# Patient Record
Sex: FEMALE | Race: WHITE | NOT HISPANIC OR LATINO | Employment: OTHER | URBAN - METROPOLITAN AREA
[De-identification: names, ages, dates, MRNs, and addresses within clinical notes are randomized per-mention and may not be internally consistent; named-entity substitution may affect disease eponyms.]

---

## 2023-12-05 PROBLEM — R13.19 ESOPHAGEAL DYSPHAGIA: Status: ACTIVE | Noted: 2023-11-29

## 2024-02-08 ENCOUNTER — TELEPHONE (OUTPATIENT)
Dept: GASTROENTEROLOGY | Facility: CLINIC | Age: 79
End: 2024-02-08
Payer: MEDICARE

## 2024-02-14 ENCOUNTER — OFFICE VISIT (OUTPATIENT)
Dept: SURGERY | Facility: CLINIC | Age: 79
End: 2024-02-14
Payer: MEDICARE

## 2024-02-14 ENCOUNTER — PREP FOR SURGERY (OUTPATIENT)
Dept: OTHER | Facility: HOSPITAL | Age: 79
End: 2024-02-14
Payer: MEDICARE

## 2024-02-14 VITALS — HEIGHT: 65 IN | HEART RATE: 68 BPM | SYSTOLIC BLOOD PRESSURE: 149 MMHG | DIASTOLIC BLOOD PRESSURE: 80 MMHG

## 2024-02-14 DIAGNOSIS — R13.11 ORAL PHASE DYSPHAGIA: Primary | ICD-10-CM

## 2024-02-14 DIAGNOSIS — R13.12 OROPHARYNGEAL DYSPHAGIA: Primary | ICD-10-CM

## 2024-02-14 DIAGNOSIS — K21.9 GASTROESOPHAGEAL REFLUX DISEASE WITHOUT ESOPHAGITIS: ICD-10-CM

## 2024-02-14 DIAGNOSIS — K21.00 GASTROESOPHAGEAL REFLUX DISEASE WITH ESOPHAGITIS WITHOUT HEMORRHAGE: ICD-10-CM

## 2024-02-14 RX ORDER — BACLOFEN 10 MG/1
TABLET ORAL
COMMUNITY
Start: 2024-01-09

## 2024-02-14 RX ORDER — MONTELUKAST SODIUM 4 MG/1
1 TABLET, CHEWABLE ORAL
COMMUNITY
Start: 2024-02-01

## 2024-02-14 RX ORDER — SODIUM CHLORIDE 9 MG/ML
40 INJECTION, SOLUTION INTRAVENOUS AS NEEDED
OUTPATIENT
Start: 2024-02-14

## 2024-02-14 RX ORDER — PANTOPRAZOLE SODIUM 40 MG/1
TABLET, DELAYED RELEASE ORAL
COMMUNITY
Start: 2024-01-09

## 2024-02-14 RX ORDER — SODIUM CHLORIDE 0.9 % (FLUSH) 0.9 %
10 SYRINGE (ML) INJECTION AS NEEDED
OUTPATIENT
Start: 2024-02-14

## 2024-02-14 RX ORDER — SODIUM CHLORIDE 0.9 % (FLUSH) 0.9 %
3 SYRINGE (ML) INJECTION EVERY 12 HOURS SCHEDULED
OUTPATIENT
Start: 2024-02-14

## 2024-02-19 ENCOUNTER — TELEPHONE (OUTPATIENT)
Dept: SURGERY | Facility: CLINIC | Age: 79
End: 2024-02-19
Payer: MEDICARE

## 2024-02-19 NOTE — TELEPHONE ENCOUNTER
Called 's office and spoke with staff in regards to pts clearance letter.  I have not received a response back with an approval or denial.  Staff was not able to let me know if it was received or not but sent a message to 's medical assistant as urgent. My contact information was given so that staff is able to contact our office.

## 2024-02-20 NOTE — TELEPHONE ENCOUNTER
Clearance is scanned into media    Called and spoke to Moriah.  Informed her that  needs to stop her Plavix 5 days prior to her EGD on 2/28. Moriah v/venancio.

## 2024-02-21 NOTE — PRE-PROCEDURE INSTRUCTIONS
PAT call attempted.  No answer.  Left detailed message with:  date, arrival time of  0830,  location, need for , children under 12 must remain in waiting room, diet/NPO, meds, bring list of meds to hospital, and call back number for questions.  Hold Plavix for 5 days prior to procedure.  Clearance noted on chart.

## 2024-02-21 NOTE — PRE-PROCEDURE INSTRUCTIONS
"Instructed on date and arrival time of 0830. Come to entrance \"C\".  Must have  over age 18 to drive home.  May have two visitors; however, children under 12 must stay in waiting room.  Discussed diet/NPO.  May take medications as usual except for blood thinners, diabetic medications, or weight loss medications.  Bring list of medications to hospital.  Verbalized understanding of instructions given.  Instructed to call for questions or concerns.  Hold Plavix for 5 days prior to procedure.  Clearance noted in chart.  "

## 2024-02-26 ENCOUNTER — TELEPHONE (OUTPATIENT)
Dept: SURGERY | Facility: CLINIC | Age: 79
End: 2024-02-26
Payer: MEDICARE

## 2024-02-26 NOTE — TELEPHONE ENCOUNTER
Patient has the stomach bug and needs to r/s EGD that's on for 2-28-24. She has been r/s to 3-13-24 with a 1:45pm arrival time. I spoke with Ivonne in surgery scheduling. Patient has been instructed to resume Plavix and to stop 5 days prior to EGD on 3-13-24.

## 2024-03-05 ENCOUNTER — TELEPHONE (OUTPATIENT)
Dept: SURGERY | Facility: CLINIC | Age: 79
End: 2024-03-05
Payer: MEDICARE

## 2024-03-05 NOTE — TELEPHONE ENCOUNTER
Pt grand-daughter Rhys is aware that Pt is r/s to 3/19/24 with a 7:30 am arrival. Hold Plavix 5 days prior.

## 2024-03-12 NOTE — PRE-PROCEDURE INSTRUCTIONS
"Instructed on date and arrival time of 0730. Come to entrance \"C\".  Must have  over age 18 to drive home.  May have two visitors; however, children under 12 must stay in waiting room.  Discussed diet/NPO.  May take medications as usual except for blood thinners, diabetic medications, or weight loss medications.  Bring list of medications to hospital.  Verbalized understanding of instructions given.  Instructed to call for questions or concerns.  Hold Plavix for 6 days prior to procedure.  Clearance on chart.  Spoke with daughter in law.  "

## 2024-03-14 ENCOUNTER — HOSPITAL ENCOUNTER (OUTPATIENT)
Dept: MRI IMAGING | Facility: HOSPITAL | Age: 79
Discharge: HOME | End: 2024-03-14
Admitting: NURSE PRACTITIONER
Payer: MEDICARE

## 2024-03-14 DIAGNOSIS — K86.89 PANCREATIC DUCT DILATED: ICD-10-CM

## 2024-03-14 DIAGNOSIS — R74.8 ELEVATED LIVER ENZYMES: ICD-10-CM

## 2024-03-14 DIAGNOSIS — R74.8 ELEVATED LIPASE: ICD-10-CM

## 2024-03-14 DIAGNOSIS — K83.8 DILATED BILE DUCT: ICD-10-CM

## 2024-03-14 LAB
CREAT BLDA-MCNC: 0.9 MG/DL (ref 0.6–1.3)
EGFRCR SERPLBLD CKD-EPI 2021: 65.6 ML/MIN/1.73

## 2024-03-14 PROCEDURE — 82565 ASSAY OF CREATININE: CPT

## 2024-03-14 PROCEDURE — 74183 MRI ABD W/O CNTR FLWD CNTR: CPT

## 2024-03-14 PROCEDURE — A9577 INJ MULTIHANCE: HCPCS | Performed by: NURSE PRACTITIONER

## 2024-03-14 PROCEDURE — 0 GADOBENATE DIMEGLUMINE 529 MG/ML SOLUTION: Performed by: NURSE PRACTITIONER

## 2024-03-14 RX ADMIN — GADOBENATE DIMEGLUMINE 10 ML: 529 INJECTION, SOLUTION INTRAVENOUS at 13:31

## 2024-03-18 ENCOUNTER — ANESTHESIA EVENT (OUTPATIENT)
Dept: GASTROENTEROLOGY | Facility: HOSPITAL | Age: 79
End: 2024-03-18
Payer: MEDICARE

## 2024-03-18 NOTE — H&P
Chief Complaint: EGD (consult)    Patient is here to have an EGD.  Following is a copy of the HPI from the patient's office visit at the surgery clinic.     History of Present Illness  Amy Rhodes is a 78 y.o. female presents to [facility] for EGD consultation.    Patient presents today with her caregiver.  Patient is a poor historian.    Patient presents today with complaints of dysphagia.  She does admit to heartburn and indigestion despite taking pantoprazole.  Patient does report that she has having a lot of choking issues.  She also reports and feels that she gets a lump in her posterior throat.  Patient reports that she has trouble with foods especially dry meats in her posterior throat.  Reports drinking excessive amounts of liquids to move the food down.  She denies any nausea or vomiting.    Patient is taking Plavix.  She is unsure why. I will start with Dr. Butler.     She denies taking any GLP-1 receptors.    Denies ASHLEIGH.    8/12: egd & colonoscopy ([place]): reflux esophagitis; chronic gastritis; normal colon.       9/23: Study Result    Narrative & Impression   PROCEDURE:  US ABDOMEN LIMITED     COMPARISON: None     INDICATIONS:  ELEVATED LFTS,UPPER RIGHT QUADRANT PAIN     TECHNIQUE:    Ultrasound examination of the right upper quadrant of the abdomen was performed.       FINDINGS:          Liver:  Liver is normal in contour and echogenicity.  Mild intrahepatic biliary ductal dilation   noted.  No focal lesion noted.  Hepatic vein and portal vein are patent.     Biliary:  Patient is status post cholecystectomy.  Common bile duct measures 9 mm.     Pancreas:  Visualized portions of the pancreas are grossly unremarkable.  Mild prominence of the   pancreatic duct noted which may be related to patient age and/or prior cholecystectomy.  This   measures approximately 4 mm.     Right kidney:  Right kidney is normal in appearance.  Right kidney measures 8.3 cm     Other:   No right upper quadrant ascites        IMPRESSION:                 1. Status post cholecystectomy  2. Dilation of the biliary collecting system and pancreatic duct favored to represent sequela of   prior cholecystectomy.  Correlation with bilirubin levels and amylase and lipase could be   performed.  If these are abnormal additional imaging with MRCP could always be considered            JEANA MAIN MD            Objective     Past Medical History:   Diagnosis Date    Irritable bowel syndrome     Nausea     Stroke     x 3       Past Surgical History:   Procedure Laterality Date    CHOLECYSTECTOMY      COLONOSCOPY         Outpatient Medications Marked as Taking for the 2/14/24 encounter (Office Visit) with Bruno April, APRN   Medication Sig Dispense Refill    atorvastatin (LIPITOR) 80 MG tablet Take 1 tablet by mouth Daily.      baclofen (LIORESAL) 10 MG tablet       clopidogrel (PLAVIX) 75 MG tablet Take 1 tablet by mouth Daily.      colestipol (COLESTID) 1 g tablet Take 1 tablet by mouth.      lisinopril (PRINIVIL,ZESTRIL) 20 MG tablet Take 1 tablet by mouth Daily.      pantoprazole (PROTONIX) 40 MG EC tablet       zinc oxide 20 % ointment Apply  topically to the appropriate area as directed.         Allergies   Allergen Reactions    Codeine Dizziness, Nausea And Vomiting and Nausea Only        Family History   Problem Relation Age of Onset    Colon cancer Father     Irritable bowel syndrome Sister     Colon polyps Neg Hx     Crohn's disease Neg Hx     Ulcerative colitis Neg Hx        Social History     Socioeconomic History    Marital status:    Tobacco Use    Smoking status: Never    Smokeless tobacco: Never   Vaping Use    Vaping Use: Never used   Substance and Sexual Activity    Alcohol use: Never    Drug use: Defer    Sexual activity: Defer     Physical Exam  Vitals - Available in the EMR.   Respiratory:  breathing not labored, respiratory effort appears normal  Cardiovascular:  heart regular  rate  Skin and subcutaneous tissue:  warm and dry  Musculoskeletal: moving all extremities symmetrically and purposefully  Neurologic:  no obvious motor or sensory deficits, speech clear  Psychiatric:  judgment and insight intact, mood normal      Assessment   1. Oral phase dysphagia  2. Gastroesophageal reflux disease without esophagitis    Plan  Esophagogastroduodenoscopy    Risks and benefits discussed    Raghav Snyder M.D.  03/18/24    Electronically signed by aRghav Snyder MD, 03/18/24, 10:33 AM EDT.

## 2024-03-18 NOTE — ANESTHESIA PREPROCEDURE EVALUATION
" Anesthesia Evaluation                  Airway   Mallampati: I  TM distance: >3 FB  Neck ROM: full  Small opening and No difficulty expected  Dental    (+) edentulous    Pulmonary - normal exam    breath sounds clear to auscultation  Cardiovascular - normal exam    Rhythm: regular    (+) hypertension (146/77)      Neuro/Psych  (+) CVA (left sided numbness.weakness. uses wheelchair and has 24/7 full care.home care provider with pt in preop)  GI/Hepatic/Renal/Endo    (+) GERD (reasons: dysphagia and heartburn for egd)    Musculoskeletal     Abdominal    Substance History      OB/GYN          Other        ROS/Med Hx Other: Anticoagulated on Plavix, last dose \"more than 5 days ago\" per [facility] care inhome provider    EKG 10/16/2022:  Accelerated junctional rhythm  Left axis deviation  Incomplete RBBB  Abnormal EKG  No previous EKG available    !!! Echo 10/2022... found in media section but so small difficult to read. Doesn't allow me to print. Ef 42%. (Report Not legible)    ECHO 10/20/22: unable to view results    Note from 10/26/22 states pt had pacemaker device check, unable to find any other documentation regarding pt having pacemaker                Anesthesia Plan    ASA 3     general   total IV anesthesia  (Total IV Anesthesia    Patient understands anesthesia not responsible for dental damage.  )  intravenous induction     Anesthetic plan, risks, benefits, and alternatives have been provided, discussed and informed consent has been obtained with: patient.  Pre-procedure education provided  Plan discussed with CRNA.      CODE STATUS:         "

## 2024-03-19 ENCOUNTER — ANESTHESIA (OUTPATIENT)
Dept: GASTROENTEROLOGY | Facility: HOSPITAL | Age: 79
End: 2024-03-19
Payer: MEDICARE

## 2024-03-19 ENCOUNTER — HOSPITAL ENCOUNTER (OUTPATIENT)
Facility: HOSPITAL | Age: 79
Setting detail: HOSPITAL OUTPATIENT SURGERY
Discharge: HOME | End: 2024-03-19
Attending: SURGERY | Admitting: SURGERY
Payer: MEDICARE

## 2024-03-19 VITALS
WEIGHT: 101.42 LBS | HEART RATE: 58 BPM | DIASTOLIC BLOOD PRESSURE: 68 MMHG | TEMPERATURE: 98 F | SYSTOLIC BLOOD PRESSURE: 161 MMHG | RESPIRATION RATE: 18 BRPM | BODY MASS INDEX: 16.88 KG/M2 | OXYGEN SATURATION: 98 %

## 2024-03-19 DIAGNOSIS — R13.12 OROPHARYNGEAL DYSPHAGIA: ICD-10-CM

## 2024-03-19 DIAGNOSIS — R13.19 ESOPHAGEAL DYSPHAGIA: ICD-10-CM

## 2024-03-19 DIAGNOSIS — K21.00 GASTROESOPHAGEAL REFLUX DISEASE WITH ESOPHAGITIS WITHOUT HEMORRHAGE: ICD-10-CM

## 2024-03-19 PROCEDURE — 25010000002 PROPOFOL 10 MG/ML EMULSION: Performed by: NURSE ANESTHETIST, CERTIFIED REGISTERED

## 2024-03-19 PROCEDURE — 25810000003 LACTATED RINGERS PER 1000 ML: Performed by: NURSE PRACTITIONER

## 2024-03-19 PROCEDURE — 25810000003 LACTATED RINGERS PER 1000 ML: Performed by: NURSE ANESTHETIST, CERTIFIED REGISTERED

## 2024-03-19 PROCEDURE — 88305 TISSUE EXAM BY PATHOLOGIST: CPT | Performed by: SURGERY

## 2024-03-19 RX ORDER — SODIUM CHLORIDE 0.9 % (FLUSH) 0.9 %
3 SYRINGE (ML) INJECTION EVERY 12 HOURS SCHEDULED
Status: DISCONTINUED | OUTPATIENT
Start: 2024-03-19 | End: 2024-03-19 | Stop reason: HOSPADM

## 2024-03-19 RX ORDER — SODIUM CHLORIDE, SODIUM LACTATE, POTASSIUM CHLORIDE, CALCIUM CHLORIDE 600; 310; 30; 20 MG/100ML; MG/100ML; MG/100ML; MG/100ML
30 INJECTION, SOLUTION INTRAVENOUS CONTINUOUS
Status: DISCONTINUED | OUTPATIENT
Start: 2024-03-19 | End: 2024-03-19 | Stop reason: HOSPADM

## 2024-03-19 RX ORDER — SODIUM CHLORIDE 9 MG/ML
40 INJECTION, SOLUTION INTRAVENOUS AS NEEDED
Status: DISCONTINUED | OUTPATIENT
Start: 2024-03-19 | End: 2024-03-19 | Stop reason: HOSPADM

## 2024-03-19 RX ORDER — PROPOFOL 10 MG/ML
VIAL (ML) INTRAVENOUS AS NEEDED
Status: DISCONTINUED | OUTPATIENT
Start: 2024-03-19 | End: 2024-03-19 | Stop reason: SURG

## 2024-03-19 RX ORDER — SODIUM CHLORIDE, SODIUM LACTATE, POTASSIUM CHLORIDE, CALCIUM CHLORIDE 600; 310; 30; 20 MG/100ML; MG/100ML; MG/100ML; MG/100ML
30 INJECTION, SOLUTION INTRAVENOUS CONTINUOUS PRN
Status: DISCONTINUED | OUTPATIENT
Start: 2024-03-19 | End: 2024-03-19 | Stop reason: HOSPADM

## 2024-03-19 RX ORDER — SODIUM CHLORIDE 0.9 % (FLUSH) 0.9 %
10 SYRINGE (ML) INJECTION AS NEEDED
Status: DISCONTINUED | OUTPATIENT
Start: 2024-03-19 | End: 2024-03-19 | Stop reason: HOSPADM

## 2024-03-19 RX ORDER — LIDOCAINE HYDROCHLORIDE 20 MG/ML
INJECTION, SOLUTION EPIDURAL; INFILTRATION; INTRACAUDAL; PERINEURAL AS NEEDED
Status: DISCONTINUED | OUTPATIENT
Start: 2024-03-19 | End: 2024-03-19 | Stop reason: SURG

## 2024-03-19 RX ADMIN — PROPOFOL 40 MG: 10 INJECTION, EMULSION INTRAVENOUS at 09:30

## 2024-03-19 RX ADMIN — PROPOFOL 150 MCG/KG/MIN: 10 INJECTION, EMULSION INTRAVENOUS at 09:30

## 2024-03-19 RX ADMIN — LIDOCAINE HYDROCHLORIDE 50 MG: 20 INJECTION, SOLUTION INTRAVENOUS at 09:30

## 2024-03-19 RX ADMIN — SODIUM CHLORIDE, POTASSIUM CHLORIDE, SODIUM LACTATE AND CALCIUM CHLORIDE 30 ML/HR: 600; 310; 30; 20 INJECTION, SOLUTION INTRAVENOUS at 08:39

## 2024-03-19 RX ADMIN — SODIUM CHLORIDE, POTASSIUM CHLORIDE, SODIUM LACTATE AND CALCIUM CHLORIDE: 600; 310; 30; 20 INJECTION, SOLUTION INTRAVENOUS at 09:27

## 2024-03-19 NOTE — ANESTHESIA POSTPROCEDURE EVALUATION
Patient: Amy Rhodes    Procedure Summary       Date: 03/19/24 Room / Location: [facility] ENDOSCOPY 1 / [facility] ENDOSCOPY    Anesthesia Start: 0927 Anesthesia Stop: 0941    Procedure: ESOPHAGOGASTRODUODENOSCOPY with biopsy Diagnosis:       Oropharyngeal dysphagia      Gastroesophageal reflux disease with esophagitis without hemorrhage      (Oropharyngeal dysphagia [R13.12])      (Gastroesophageal reflux disease with esophagitis without hemorrhage [K21.00])    Surgeons: Raghav Snyder MD Provider: Christie Huston CRNA    Anesthesia Type: general ASA Status: 3            Anesthesia Type: general    Vitals  Vitals Value Taken Time   /68 03/19/24 0955   Temp 36.7 °C (98 °F) 03/19/24 0940   Pulse 65 03/19/24 0956   Resp 18 03/19/24 0955   SpO2 100 % 03/19/24 0956   Vitals shown include unfiled device data.        Post Anesthesia Care and Evaluation    Post-procedure mental status: acceptable.  Pain management: satisfactory to patient    Airway patency: patent  Anesthetic complications: No anesthetic complications    Cardiovascular status: acceptable  Respiratory status: acceptable    Comments: Per chart review

## 2024-03-20 LAB
CYTO UR: NORMAL
LAB AP CASE REPORT: NORMAL
LAB AP CLINICAL INFORMATION: NORMAL
PATH REPORT.FINAL DX SPEC: NORMAL
PATH REPORT.GROSS SPEC: NORMAL

## 2024-05-14 ENCOUNTER — LAB (OUTPATIENT)
Dept: LAB | Facility: HOSPITAL | Age: 79
End: 2024-05-14
Payer: MEDICARE

## 2024-05-14 ENCOUNTER — OFFICE VISIT (OUTPATIENT)
Dept: NEUROLOGY | Facility: CLINIC | Age: 79
End: 2024-05-14
Payer: MEDICARE

## 2024-05-14 ENCOUNTER — HOSPITAL ENCOUNTER (OUTPATIENT)
Dept: OTHER | Facility: HOSPITAL | Age: 79
Discharge: HOME | End: 2024-05-14

## 2024-05-14 VITALS
HEIGHT: 65 IN | BODY MASS INDEX: 16.88 KG/M2 | DIASTOLIC BLOOD PRESSURE: 67 MMHG | SYSTOLIC BLOOD PRESSURE: 121 MMHG | HEART RATE: 70 BPM

## 2024-05-14 DIAGNOSIS — Z86.73 HISTORY OF STROKE: Primary | ICD-10-CM

## 2024-05-14 DIAGNOSIS — E78.2 MIXED HYPERLIPIDEMIA: ICD-10-CM

## 2024-05-14 NOTE — PROGRESS NOTES
"Chief Complaint  Neurologic Problem    Subjective          Amy Rhodes presents to [facility]  History of Present Illness  Initial consult for stroke.  Presents to the office with caregiver.  Patient and caregiver are poor historians. History of stroke 2023.  Acute left sided weakness.  MRI showed right CRISTOBAL infarct.  Denies recurrent stroke like symptoms. Has residual left sided weakness, worse in the leg than the arm.       Objective   Vital Signs:   /67   Pulse 70   Ht 165.1 cm (65\")   BMI 16.88 kg/m²     Physical Exam   Neurologic Exam     Motor Exam     Strength   Right biceps: 5/5  Left biceps: 3/5  Right triceps: 5/5  Left triceps: 3/5  Right quadriceps: 5/5  Left quadriceps: 1/5  Right hamstrin/5  Left hamstrin/5  Right anterior tibial: 5/5  Left anterior tibial: 1/5  Right posterior tibial: 5/5  Left posterior tibial: 1/5    Gait, Coordination, and Reflexes Wheelchair bound         Result Review :             MRI Brain 2023:   Limited images the proximal cord are unremarkable. The   ventricles are enlarged. There is moderate diffuse atrophy. There is   severe encephalomalacia of the medial right frontal lobe consistent with   remote right CRISTOBAL infarct. There is periventricular and deep white matter   change likely related to small vessel disease. There is no extra-axial   fluid or midline shift. There is no evidence of acute hemorrhage.   Flow-voids are appropriate. Diffusion weighted images demonstrate no   evidence of acute CVA. Limited images of the paranasal sinuses are   unremarkable.     Assessment and Plan    Diagnoses and all orders for this visit:    1. History of stroke (Primary)  Assessment & Plan:  Will order CTA head/neck for further evaluation of right CRISTOBAL stroke. Will order labs. Continue plavix and atorvastatin for secondary stroke risk reduction.     Discussed signs and symptoms of stroke including, but not limited to, " visual disturbances, weakness of one side of the body, facial droop, cognitive changes, slurred speech, imbalance and dizziness.  Instructed patient to call 911 and get emergent medical treatment immediately at the onset of those symptoms.   Patient verbalized understanding. .      Orders:  -     CT Angiogram Head; Future  -     CT Angiogram Neck With & Without Contrast; Future  -     Lipid Panel; Future  -     CBC (No Diff); Future  -     Comprehensive Metabolic Panel; Future  -     Vitamin B12 & Folate; Future    2. Mixed hyperlipidemia  -     CT Angiogram Head; Future  -     CT Angiogram Neck With & Without Contrast; Future  -     Lipid Panel; Future  -     CBC (No Diff); Future  -     Comprehensive Metabolic Panel; Future  -     Vitamin B12 & Folate; Future      I spent 45 minutes caring for Amy on this date of service. This time includes time spent by me in the following activities:preparing for the visit, reviewing tests, obtaining and/or reviewing a separately obtained history, performing a medically appropriate examination and/or evaluation , counseling and educating the patient/family/caregiver, ordering medications, tests, or procedures, documenting information in the medical record, and independently interpreting results and communicating that information with the patient/family/caregiver  Follow Up   Return in about 4 months (around 9/14/2024) for stroke f/u.  Patient was given instructions and counseling regarding her condition or for health maintenance advice. Please see specific information pulled into the AVS if appropriate.

## 2024-05-14 NOTE — PATIENT INSTRUCTIONS
Stroke Prevention  Some medical conditions and behaviors can lead to a higher chance of having a stroke. You can help prevent a stroke by eating healthy, exercising, not smoking, and managing any medical conditions you have.  Stroke is a leading cause of functional impairment. Primary prevention is particularly important because a majority of strokes are first-time events. Stroke changes the lives of not only those who experience a stroke but also their family and other caregivers.  How can this condition affect me?  A stroke is a medical emergency and should be treated right away. A stroke can lead to brain damage and can sometimes be life-threatening. If a person gets medical treatment right away, there is a better chance of surviving and recovering from a stroke.  What can increase my risk?  The following medical conditions may increase your risk of a stroke:  Cardiovascular disease.  High blood pressure (hypertension).  Diabetes.  High cholesterol.  Sickle cell disease.  Blood clotting disorders (hypercoagulable state).  Obesity.  Sleep disorders (obstructive sleep apnea).  Other risk factors include:  Being older than age 60.  Having a history of blood clots, stroke, or mini-stroke (transient ischemic attack, TIA).  Genetic factors, such as race, ethnicity, or a family history of stroke.  Smoking cigarettes or using other tobacco products.  Taking birth control pills, especially if you also use tobacco.  Heavy use of alcohol or drugs, especially cocaine and methamphetamine.  Physical inactivity.  What actions can I take to prevent this?  Manage your health conditions  High cholesterol levels.  Eating a healthy diet is important for preventing high cholesterol. If cholesterol cannot be managed through diet alone, you may need to take medicines.  Take any prescribed medicines to control your cholesterol as told by your health care provider.  Hypertension.  To reduce your risk of stroke, try to keep your blood  pressure below 130/80.  Eating a healthy diet and exercising regularly are important for controlling blood pressure. If these steps are not enough to manage your blood pressure, you may need to take medicines.  Take any prescribed medicines to control hypertension as told by your health care provider.  Ask your health care provider if you should monitor your blood pressure at home.  Have your blood pressure checked every year, even if your blood pressure is normal. Blood pressure increases with age and some medical conditions.  Diabetes.  Eating a healthy diet and exercising regularly are important parts of managing your blood sugar (glucose). If your blood sugar cannot be managed through diet and exercise, you may need to take medicines.  Take any prescribed medicines to control your diabetes as told by your health care provider.  Get evaluated for obstructive sleep apnea. Talk to your health care provider about getting a sleep evaluation if you snore a lot or have excessive sleepiness.  Make sure that any other medical conditions you have, such as atrial fibrillation or atherosclerosis, are managed.  Nutrition  Follow instructions from your health care provider about what to eat or drink to help manage your health condition. These instructions may include:  Reducing your daily calorie intake.  Limiting how much salt (sodium) you use to 1,500 milligrams (mg) each day.  Using only healthy fats for cooking, such as olive oil, canola oil, or sunflower oil.  Eating healthy foods. You can do this by:  Choosing foods that are high in fiber, such as whole grains, and fresh fruits and vegetables.  Eating at least 5 servings of fruits and vegetables a day. Try to fill one-half of your plate with fruits and vegetables at each meal.  Choosing lean protein foods, such as lean cuts of meat, poultry without skin, fish, tofu, beans, and nuts.  Eating low-fat dairy products.  Avoiding foods that are high in sodium. This can help  "lower blood pressure.  Avoiding foods that have saturated fat, trans fat, and cholesterol. This can help prevent high cholesterol.  Avoiding processed and prepared foods.  Counting your daily carbohydrate intake.    Lifestyle  If you drink alcohol:  Limit how much you have to:  0-1 drink a day for women who are not pregnant.  0-2 drinks a day for men.  Know how much alcohol is in your drink. In the [place] one drink equals one 12 oz bottle of beer (355mL), one 5 oz glass of wine (148mL), or one 1½ oz glass of hard liquor (44mL).  Do not use any products that contain nicotine or tobacco. These products include cigarettes, chewing tobacco, and vaping devices, such as e-cigarettes. If you need help quitting, ask your health care provider.  Avoid secondhand smoke.  Do not use drugs.  Activity    Try to stay at a healthy weight.  Get at least 30 minutes of exercise on most days, such as:  Fast walking.  Biking.  Swimming.  Medicines  Take over-the-counter and prescription medicines only as told by your health care provider. Aspirin or blood thinners (antiplatelets or anticoagulants) may be recommended to reduce your risk of forming blood clots that can lead to stroke.  Avoid taking birth control pills. Talk to your health care provider about the risks of taking birth control pills if:  You are over 35 years old.  You smoke.  You get very bad headaches.  You have had a blood clot.  Where to find more information  American Stroke Association: www.strokeassociation.org  Get help right away if:  You or a loved one has any symptoms of a stroke. \"BE FAST\" is an easy way to remember the main warning signs of a stroke:  B - Balance. Signs are dizziness, sudden trouble walking, or loss of balance.  E - Eyes. Signs are trouble seeing or a sudden change in vision.  F - Face. Signs are sudden weakness or numbness of the face, or the face or eyelid drooping on one side.  A - Arms. Signs are weakness or numbness in an arm. This happens " "suddenly and usually on one side of the body.  S - Speech. Signs are sudden trouble speaking, slurred speech, or trouble understanding what people say.  T - Time. Time to call emergency services. Write down what time symptoms started.  You or a loved one has other signs of a stroke, such as:  A sudden, severe headache with no known cause.  Nausea or vomiting.  Seizure.  These symptoms may represent a serious problem that is an emergency. Do not wait to see if the symptoms will go away. Get medical help right away. Call your local emergency services (911 in the [place] Do not drive yourself to the hospital.  Summary  You can help to prevent a stroke by eating healthy, exercising, not smoking, limiting alcohol intake, and managing any medical conditions you may have.  Do not use any products that contain nicotine or tobacco. These include cigarettes, chewing tobacco, and vaping devices, such as e-cigarettes. If you need help quitting, ask your health care provider.  Remember \"BE FAST\" for warning signs of a stroke. Get help right away if you or a loved one has any of these signs.  This information is not intended to replace advice given to you by your health care provider. Make sure you discuss any questions you have with your health care provider.  Document Revised: 11/20/2023 Document Reviewed: 11/20/2023  Elsevier Patient Education © 2024 [employer]    "

## 2024-05-17 PROBLEM — E78.2 MIXED HYPERLIPIDEMIA: Status: ACTIVE | Noted: 2024-05-17

## 2024-05-17 PROBLEM — Z86.73 HISTORY OF STROKE: Status: ACTIVE | Noted: 2024-05-17

## 2024-05-17 NOTE — ASSESSMENT & PLAN NOTE
Will order CTA head/neck for further evaluation of right CRISTOBAL stroke. Will order labs. Continue plavix and atorvastatin for secondary stroke risk reduction.     Discussed signs and symptoms of stroke including, but not limited to, visual disturbances, weakness of one side of the body, facial droop, cognitive changes, slurred speech, imbalance and dizziness.  Instructed patient to call 911 and get emergent medical treatment immediately at the onset of those symptoms.   Patient verbalized understanding. .

## 2024-09-06 ENCOUNTER — LAB (OUTPATIENT)
Dept: LAB | Facility: HOSPITAL | Age: 79
End: 2024-09-06
Payer: MEDICARE

## 2024-09-06 ENCOUNTER — TRANSCRIBE ORDERS (OUTPATIENT)
Dept: ADMINISTRATIVE | Facility: HOSPITAL | Age: 79
End: 2024-09-06
Payer: MEDICARE

## 2024-09-06 ENCOUNTER — HOSPITAL ENCOUNTER (OUTPATIENT)
Dept: GENERAL RADIOLOGY | Facility: HOSPITAL | Age: 79
Discharge: HOME | End: 2024-09-06
Payer: MEDICARE

## 2024-09-06 DIAGNOSIS — M54.50 ACUTE BILATERAL LOW BACK PAIN WITHOUT SCIATICA: Primary | ICD-10-CM

## 2024-09-06 DIAGNOSIS — M54.50 ACUTE BILATERAL LOW BACK PAIN WITHOUT SCIATICA: ICD-10-CM

## 2024-09-06 DIAGNOSIS — Z86.73 HISTORY OF STROKE: ICD-10-CM

## 2024-09-06 DIAGNOSIS — E78.2 MIXED HYPERLIPIDEMIA: ICD-10-CM

## 2024-09-06 PROCEDURE — 72110 X-RAY EXAM L-2 SPINE 4/>VWS: CPT

## 2024-09-10 ENCOUNTER — TRANSCRIBE ORDERS (OUTPATIENT)
Dept: ADMINISTRATIVE | Facility: HOSPITAL | Age: 79
End: 2024-09-10
Payer: MEDICARE

## 2024-09-10 DIAGNOSIS — M48.50XA: Primary | ICD-10-CM

## 2024-09-30 ENCOUNTER — HOSPITAL ENCOUNTER (OUTPATIENT)
Dept: CT IMAGING | Facility: HOSPITAL | Age: 79
Discharge: HOME | End: 2024-09-30
Payer: MEDICARE

## 2024-09-30 ENCOUNTER — LAB (OUTPATIENT)
Dept: LAB | Facility: HOSPITAL | Age: 79
End: 2024-09-30
Payer: MEDICARE

## 2024-09-30 DIAGNOSIS — E78.2 MIXED HYPERLIPIDEMIA: ICD-10-CM

## 2024-09-30 DIAGNOSIS — Z86.73 HISTORY OF STROKE: ICD-10-CM

## 2024-09-30 LAB
CHOLEST SERPL-MCNC: 104 MG/DL (ref 0–200)
CREAT BLDA-MCNC: 1.1 MG/DL (ref 0.6–1.3)
EGFRCR SERPLBLD CKD-EPI 2021: 51.5 ML/MIN/1.73
HDLC SERPL-MCNC: 53 MG/DL (ref 40–60)
LDLC SERPL CALC-MCNC: 37 MG/DL (ref 0–100)
LDLC/HDLC SERPL: 0.72 {RATIO}
TRIGL SERPL-MCNC: 65 MG/DL (ref 0–150)
VLDLC SERPL-MCNC: 14 MG/DL (ref 5–40)

## 2024-09-30 PROCEDURE — 25510000001 IOPAMIDOL PER 1 ML: Performed by: NURSE PRACTITIONER

## 2024-09-30 PROCEDURE — 82565 ASSAY OF CREATININE: CPT

## 2024-09-30 PROCEDURE — 70498 CT ANGIOGRAPHY NECK: CPT

## 2024-09-30 PROCEDURE — 80061 LIPID PANEL: CPT

## 2024-09-30 PROCEDURE — 70496 CT ANGIOGRAPHY HEAD: CPT

## 2024-09-30 PROCEDURE — 36415 COLL VENOUS BLD VENIPUNCTURE: CPT

## 2024-09-30 RX ORDER — IOPAMIDOL 755 MG/ML
100 INJECTION, SOLUTION INTRAVASCULAR
Status: COMPLETED | OUTPATIENT
Start: 2024-09-30 | End: 2024-09-30

## 2024-09-30 RX ADMIN — IOPAMIDOL 100 ML: 755 INJECTION, SOLUTION INTRAVENOUS at 11:22

## 2024-10-15 ENCOUNTER — OFFICE VISIT (OUTPATIENT)
Dept: NEUROLOGY | Facility: CLINIC | Age: 79
End: 2024-10-15
Payer: MEDICARE

## 2024-10-15 VITALS
SYSTOLIC BLOOD PRESSURE: 149 MMHG | HEIGHT: 65 IN | BODY MASS INDEX: 16.88 KG/M2 | DIASTOLIC BLOOD PRESSURE: 72 MMHG | HEART RATE: 70 BPM

## 2024-10-15 DIAGNOSIS — Z86.73 HISTORY OF STROKE: Primary | ICD-10-CM

## 2024-10-15 DIAGNOSIS — E78.2 MIXED HYPERLIPIDEMIA: ICD-10-CM

## 2024-10-15 RX ORDER — PANTOPRAZOLE SODIUM 20 MG/1
20 TABLET, DELAYED RELEASE ORAL DAILY
COMMUNITY

## 2024-10-15 RX ORDER — TRAMADOL HYDROCHLORIDE 50 MG/1
TABLET ORAL
COMMUNITY
Start: 2024-10-08

## 2024-10-15 NOTE — PROGRESS NOTES
"Chief Complaint  Neurologic Problem (Stroke HX)    Subjective          Amy Rhodes presents to [facility]  History of Present Illness    History of Present Illness  The patient is a 78-year-old female who presents to the office for follow-up for stroke, remains on Plavix and atorvastatin for vascular risk reduction, had CTA head and neck, which were both unrevealing since previous visit.    She reports experiencing what she describes as a minor seizure, during which she felt a sharp sensation in her arm, akin to being pricked with a pin. This sensation extended down to her feet. She remained conscious throughout the episode. The affected arm is on the same side as her previous stroke.       Objective   Vital Signs:   /72   Pulse 70   Ht 165.1 cm (65\")   BMI 16.88 kg/m²     Physical Exam  Eyes:      General: Lids are normal.      Extraocular Movements: Extraocular movements intact.      Pupils: Pupils are equal, round, and reactive to light.        Neurological Exam  Mental Status  Awake, alert and oriented to person, place and time.    Cranial Nerves  CN II: Visual acuity is normal. Visual fields full to confrontation.  CN III, IV, VI: Extraocular movements intact bilaterally. Normal lids and orbits bilaterally. Pupils equal round and reactive to light bilaterally.  CN V: Facial sensation is normal.  CN VII: Full and symmetric facial movement.  CN IX, X: Palate elevates symmetrically. Normal gag reflex.  CN XI: Shoulder shrug strength is normal.  CN XII: Tongue midline without atrophy or fasciculations.    Motor   Left hemiparesis.    Gait    Wheelchair .      Result Review :   CBC:  Lab Results   Component Value Date    WBC 4.17 (L) 06/12/2024    RBC 3.68 (L) 06/12/2024    HGB 11.1 (L) 06/12/2024    HCT 34.4 (L) 06/12/2024    MCV 93.5 06/12/2024    MCH 30.2 06/12/2024    MCHC 32.3 06/12/2024    RDW 14.4 06/12/2024     06/12/2024     CMP:  Lab Results "   Component Value Date    BUN 23 06/04/2022    CREATININE 1.10 09/30/2024     (L) 06/04/2022    K 4.5 06/04/2022     06/04/2022    CALCIUM 9.6 06/04/2022    PROTENTOTREF 6.4 11/27/2023    ALBUMIN 3.8 11/27/2023    BILITOT 0.3 11/27/2023    ALKPHOS 71 11/27/2023    AST 19 11/27/2023    ALT 10 11/27/2023     LIPID PANEL:  Lab Results   Component Value Date    TRIG 65 09/30/2024    HDL 53 09/30/2024    VLDL 14 09/30/2024    LDL 37 09/30/2024    LDLHDL 0.72 09/30/2024              CTA Neck:  1. No intracranial or extracranial stenoses, aneurysms, or occlusions.     CTA Head:   1. No intracranial or extracranial stenoses, aneurysms, or occlusions.       Assessment and Plan    There are no diagnoses linked to this encounter.    Assessment & Plan  1. Stroke follow-up.  The pins and needles sensation on the side affected by the stroke is likely due to nerve irritability from the old stroke deficit. 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 Her cholesterol and blood pressure levels are within normal range. The CTA of the head and neck did not reveal any significant blockages. She will continue her current regimen of atorvastatin and Plavix for vascular risk reduction.    Follow-up  Return in 1 year for follow up.         Follow Up   Return in about 1 year (around 10/15/2025) for stroke f/u.  Patient was given instructions and counseling regarding her condition or for health maintenance advice. Please see specific information pulled into the AVS if appropriate.       Patient or patient representative verbalized consent for the use of Ambient Listening during the visit with  CHARLEY Kwon for chart documentation. 10/15/2024  11:47 EDT

## (undated) DEVICE — CONN JET HYDRA H20 AUXILIARY DISP

## (undated) DEVICE — SOL IRRG H2O PL/BG 1000ML STRL

## (undated) DEVICE — BLCK/BITE BLOX WO/DENTL/RIM W/STRAP 54F

## (undated) DEVICE — Device: Brand: DEFENDO AIR/WATER/SUCTION AND BIOPSY VALVE

## (undated) DEVICE — Device

## (undated) DEVICE — GLV SURG BIOGEL LTX PF 7 1/2

## (undated) DEVICE — SOL IRR NACL 0.9PCT BT 1000ML

## (undated) DEVICE — SOLIDIFIER LIQLOC PLS 1500CC BT

## (undated) DEVICE — LINER SURG CANSTR SXN S/RIGD 1500CC

## (undated) DEVICE — SINGLE-USE BIOPSY FORCEPS: Brand: RADIAL JAW 4